# Patient Record
Sex: MALE | Race: WHITE | URBAN - METROPOLITAN AREA
[De-identification: names, ages, dates, MRNs, and addresses within clinical notes are randomized per-mention and may not be internally consistent; named-entity substitution may affect disease eponyms.]

---

## 2022-11-10 ENCOUNTER — HOSPITAL ENCOUNTER (OUTPATIENT)
Dept: PHYSICAL THERAPY | Age: 62
Discharge: HOME | End: 2022-11-10
Payer: COMMERCIAL

## 2022-11-10 PROCEDURE — 97161 PT EVAL LOW COMPLEX 20 MIN: CPT

## 2022-11-10 PROCEDURE — 97535 SELF CARE MNGMENT TRAINING: CPT

## 2022-11-10 NOTE — THERAPY EVALUATION
[facility] AT [address] [place] [address] [facility], [address] ,[address], [address]  Phone: [phone]  Fax: [phone] / [address] [place] [address] PHYSICAL THERAPY SERVICES  Patient Name: Keyla Gerardo : 1960   Medical   Diagnosis: Back pain with sciatica [M54.9, M54.30]  Hamstring injury [S76.309A] Treatment Diagnosis: Back pain with B sciatica    Onset Date: 6 months ago     Referral Source: Adrian Mcintosh MD [facility]): 11/10/2022   Prior Hospitalization: See medical history Provider #: [id number]   Prior Level of Function: IND all ADLs   Comorbidities: HTN   Medications: Verified on Patient Summary List   The Plan of Care and following information is based on the information from the initial evaluation.   ===========================================================================================  Assessment / baker information:  Keyla Gerardo is a 63 y.o. M who presents to skilled PT for the treatment diagnosis of B posterior leg pain. Denies any injury to B LE. Progressively getting worse over the past 6 months. It has gotten to the point when he stands for along time then he has to sit down. About a month ago, he noticed he gets pain and aching while driving >05 min in the back of the leg due to pressing on the accelerator. Minimal pain if using cruise control. He notices it going up and down the steps and also when carrying heavier loads. Denies any back pain. Denies N/T. Elan burning and sharp shooting pain. No pain while sitting. It is the worst first thing in the morning. As the day wears on, the pain lessens. Pain is located in mid belly of the hamstring, same intensity, same location both sides. Pain described as dull ache. Did not have any imaging for the legs or back. No prescription medication.      Pain:  Current: 2/10    Worst: 4/10   Best: 0/10    ===========================================================================================    Objective:   FOTO score = did not finish (an established functional score where 100 = no disability)    Posture: decreased reversal of curvature in spine    Functional Activity:  LAQ - IND B LE no tightness   Prone lying on elbows - increased leg pain  Prone lying 2 pillows - no pain in legs  Prone lying over 2 pillows with hip ext -painful for B LE with decreased B glut initial contraction    Hip AROM   ER and IR WNL no pain  Ext decreased B ~-5 deg with pain down B LE    Thoracolumbar AROM  Flexion: 100% with stretch and maybe some pain   Extension: 50% with some pain down both legs, stiff in low back   L SB: 100%  R SB: 100%  L Rot: 100%  R Rot: 100%     Hip PROM   Flexion, ABD, ADD, ER, IR WFL no pain     Hip MMT   Left             Right   Flexion 5/5  flexion 5/5  ER 4+/5    ER 4+/5  IR 4+/5    IR 4+/5*  ABD 5/5    ABD 5/5  ADD 5/5    ADD 5/5  Ext NT    ext NT    Knee MMT  (L) Flexion: 4+/5    Extension: 5/5  (R) Flexion: 4+/5    Extension: 5/5    Special test/flexibility test:  Slump test L (+), R (+)  SLR B (-) no pain  Prone quad (Ely's): L (-), R (-) no pain      Joint mobility: no pain with pressure to TP or SP L/S, no pain with spring tets ,     Pt pain seems more coming  from the back and less HS pain. Presents with stenotic pattern not favoring extension. Pt would benefit form skilled PT services addressing the current ROM, strength, functional performance, and balance impairments so that the patient to return to performing all ADLs with minimal restriction/limitation or without any functional limitations. HEP provided to the patient in order to promote improvement and self management of symptoms and functional performance:   Access Code: [email or web address]  URL: [email or web address]  Date: 11/10/2022  Prepared by: Toñito Roca    Exercises  Supine Transversus Abdominis Bracing - Hands on Stomach - 4 x daily - 7 x weekly - 10 reps - 3\" hold  Supine 90/90 Sciatic Nerve Glide with Knee Flexion/Extension - 2 x daily - 7 x weekly - 2 sets - 10 reps  Child's Pose Stretch - 2 x daily - 7 x weekly - 30-60\" hold  Reuben Stretch - 2 x daily - 7 x weekly - 30-60\" hold      ===========================================================================================  Eval Complexity: History LOW Complexity : Zero comorbidities / personal factors that will impact the outcome / POC;  Examination  LOW Complexity : 1-2 Standardized tests and measures addressing body structure, function, activity limitation and / or participation in recreation ; Presentation MEDIUM Complexity : Evolving with changing characteristics ; Decision Making Other outcome measures FOTO not filled out  LOW ; Overall Complexity LOW   Problem List: pain affecting function, decrease ROM, decrease strength, decrease ADL/ functional abilitiies, decrease activity tolerance, decrease flexibility/ joint mobility, and decrease transfer abilities   Treatment Plan may include any combination of the following: Therapeutic exercise, Neuromuscular reeducation, Manual therapy, Therapeutic activity, Self care/home management, Electric stim unattended , Needle insertion w/o injection (1 or 2 muscles), and Needle insertion w/o injection (3+ muscles)  Patient / Family readiness to learn indicated by: asking questions, trying to perform skills, and interest  Persons(s) to be included in education: patient (P)  Barriers to Learning/Limitations: None  Measures taken, if barriers to learning:    Patient Goal (s): \"Less pain    Patient self reported health status: good  Rehabilitation Potential: good  Short Term Goals: To be accomplished in  4  weeks. 1) Pt will be IND with HEP to facilitate self care management. 2) Pt will improve lumbar ext ROM to >25% without pain down B LE   3) pt will report decreased in radicular symptoms >50%  Long Term Goals:  To be accomplished in  6 weeks. 1) Pt will maintain an average pain of 3/10 or less with all activities showing a progression in overall pain levels despite increases in activity. 2)  pt will report decreased in radicular symptoms >50%  3) Pt will be able to perform driving >95 min without any radicular symptoms signifying improvement in overall functional capacity and activity tolerance. 4) Pt will improve lumbar ext ROM to >50% without pain down B LE  Frequency / Duration:   Patient to be seen  2  times per week for 6  weeks. Patient / Caregiver education and instruction: self care, activity modification, and exercises  Therapist Signature: Jason Alexis PT Date: 22/11/6150   Certification Period: NA Time: 7:55 AM   ===========================================================================================  I certify that the above Physical Therapy Services are being furnished while the patient is under my care. I agree with the treatment plan and certify that this therapy is necessary. Physician Signature:        Date:       Time:                                        Natalie Hogdes MD  Please sign and return to [facility] or you may fax the signed copy to [phone]. Thank you.

## 2022-11-10 NOTE — PROGRESS NOTES
PHYSICAL THERAPY - DAILY TREATMENT NOTE    Patient Name: Ten Whitfield        Date: 11/10/2022  : 1960   YES Patient  Verified  Visit #:      12  Insurance: Payor: BLUE CROSS / [facility] / Product Type: PPO /      In time: 102 Out time: 145   Total Treatment Time: 43     Medicare/[employer] Time Tracking (below)   Total Timed Codes (min):  23 1:1 Treatment Time:  43     TREATMENT AREA =  Back pain with sciatica [M54.9, M54.30]  Hamstring injury [S76.309A]    SUBJECTIVE    Pain Level (on 0 to 10 scale):  2  / 10   Medication Changes/New allergies or changes in medical history, any new surgeries or procedures? NO    If yes, update Summary List   Subjective Functional Status/Changes:  []  No changes reported     See POC          OBJECTIVE    23 min Self Care: Reviewed diagnosis, prognosis, therapy progression  Reviewed and educated on HEP   Rationale:    Improve understanding of injury and therapy to have realistic expectation of therapy to improve compliance/adherence and satisfaction    Billed With/As:   [] TE   [] TA   [] Neuro   [x] Self Care Patient Education: [x] Review HEP    [] Progressed/Changed HEP based on:   [] positioning   [] body mechanics   [] transfers   [] heat/ice application    [] other:        Other Objective/Functional Measures:    Shown and performed HEP     Post Treatment Pain Level (on 0 to 10) scale:   3 / 10     ASSESSMENT  Assessment/Changes in Function:     See POC     []  See Progress Note/Recertification   Patient will continue to benefit from skilled PT services to modify and progress therapeutic interventions, address functional mobility deficits, address ROM deficits, address strength deficits, analyze and address soft tissue restrictions, analyze and cue movement patterns, analyze and modify body mechanics/ergonomics, assess and modify postural abnormalities, and instruct in home and community integration to attain goals per POC.    Progress toward goals / Updated goals:    See POC     PLAN  [x]  Upgrade activities as tolerated YES Continue plan of care   []  Discharge due to :    [x]  Other: 2x per week for 6 weeks     Therapist: Evans Hassan PT    Date: 11/10/2022 Time: 12:33 PM     No future appointments.   ;